# Patient Record
(demographics unavailable — no encounter records)

---

## 2024-12-18 NOTE — PHYSICAL EXAM
[2+] : left foot dorsalis pedis 2+ [No Joint Swelling] : no joint swelling [Normal Foot/Ankle] : Both lower extremities were exposed and visualized. Standing exam demonstrates normal foot posture and alignment. Hindfoot exam shows no hindfoot valgus or varus [Skin Color & Pigmentation] : normal skin color and pigmentation [Skin Turgor] : normal skin turgor [Skin Lesions] : no skin lesions [Sensation] : the sensory exam was normal to light touch and pinprick [No Focal Deficits] : no focal deficits [Motor Exam] : the motor exam was normal [Deep Tendon Reflexes (DTR)] : deep tendon reflexes were 2+ and symmetric [General Appearance - Alert] : alert [General Appearance - Well-Appearing] : healthy appearing [Oriented To Time, Place, And Person] : oriented to person, place, and time [Varicose Veins Of Lower Extremities] : not present [Ankle Swelling (On Exam)] : not present [] : not present [Delayed in the Right Toes] : capillary refills normal in right toes [Delayed in the Left Toes] : capillary refills normal in the left toes [de-identified] : Pain along the medial band of the plantar fascia, right foot, just proximal to the sesamoid apparatus.  Pain with dorsiflexion of the digits.   [Foot Ulcer] : no foot ulcer [Skin Induration] : no skin induration

## 2024-12-18 NOTE — PHYSICAL EXAM
[2+] : left foot dorsalis pedis 2+ [No Joint Swelling] : no joint swelling [Normal Foot/Ankle] : Both lower extremities were exposed and visualized. Standing exam demonstrates normal foot posture and alignment. Hindfoot exam shows no hindfoot valgus or varus [Skin Color & Pigmentation] : normal skin color and pigmentation [Skin Turgor] : normal skin turgor [Skin Lesions] : no skin lesions [Sensation] : the sensory exam was normal to light touch and pinprick [No Focal Deficits] : no focal deficits [Motor Exam] : the motor exam was normal [Deep Tendon Reflexes (DTR)] : deep tendon reflexes were 2+ and symmetric [General Appearance - Alert] : alert [General Appearance - Well-Appearing] : healthy appearing [Oriented To Time, Place, And Person] : oriented to person, place, and time [Varicose Veins Of Lower Extremities] : not present [Ankle Swelling (On Exam)] : not present [] : not present [Delayed in the Right Toes] : capillary refills normal in right toes [Delayed in the Left Toes] : capillary refills normal in the left toes [de-identified] : Pain along the medial band of the plantar fascia, right foot, just proximal to the sesamoid apparatus.  Pain with dorsiflexion of the digits.   [Foot Ulcer] : no foot ulcer [Skin Induration] : no skin induration

## 2024-12-18 NOTE — CONSULT LETTER
[Dear  ___] : Dear  [unfilled], [Courtesy Letter:] : I had the pleasure of seeing your patient, [unfilled], in my office today. [Please see my note below.] : Please see my note below. [Consult Closing:] : Thank you very much for allowing me to participate in the care of this patient.  If you have any questions, please do not hesitate to contact me. [Sincerely,] : Sincerely, [FreeTextEntry3] : Charles M. Lombardi, DPM, FACFAS Systems Chief, Podiatric Services [facility] Assistant Professor of Surgery [facility] [employer] at [facility]  [FreeTextEntry2] : Leslie De La Garza,  [phone] [place], [place] [id number]

## 2024-12-18 NOTE — CONSULT LETTER
[Dear  ___] : Dear  [unfilled], [Courtesy Letter:] : I had the pleasure of seeing your patient, [unfilled], in my office today. [Please see my note below.] : Please see my note below. [Consult Closing:] : Thank you very much for allowing me to participate in the care of this patient.  If you have any questions, please do not hesitate to contact me. [Sincerely,] : Sincerely, [FreeTextEntry3] : Charles M. Lombardi, DPM, FACFAS Systems Chief, Podiatric Services [facility] Assistant Professor of Surgery [facility] [employer] at [facility]  [FreeTextEntry2] : Leslie De La Garza,  [phone] [place], [place] [id number]

## 2024-12-18 NOTE — ASSESSMENT
[FreeTextEntry1] : Impression: Plantar fasciitis, right, medial band (M72.2) causing pain (M79.671).  Recommendations: Attempt an injection into the right heel.  After discussing all risks, benefits and alternatives, patient consented.  Treatment: Patient was injected with a combination of 1% Xylocaine, plain, 0.5% Marcaine, plain and Kenalog-40, a total of 3cc's into the medial band of the plantar fascia, right foot.  Patient was given home care instructions, ice therapy and stretching exercises as well as shoe gear modifications.  Will reevaluate in 2 weeks, if pain persists.  Any questions or problems, patient is to contact the office.

## 2024-12-18 NOTE — HISTORY OF PRESENT ILLNESS
[FreeTextEntry1] : Patient presents today with a chief complaint of pain along the medial, longitudinal arch of the right foot, just proximal to the sesamoids along the medial band of the plantar fascia.  Patient states that this has been going on for several weeks.  He has had it in the past.  He wears custom orthotics.  Patient has typical post static dyskinesia and pain on palpation of the medial tuberosity.  He has pain with first step in the morning.

## 2024-12-18 NOTE — CONSULT LETTER
[Dear  ___] : Dear  [unfilled], [Courtesy Letter:] : I had the pleasure of seeing your patient, [unfilled], in my office today. [Please see my note below.] : Please see my note below. [Consult Closing:] : Thank you very much for allowing me to participate in the care of this patient.  If you have any questions, please do not hesitate to contact me. [Sincerely,] : Sincerely, [FreeTextEntry2] : Leslie De La Garza,  [phone] [place], [place] [id number]  [FreeTextEntry3] : Charles M. Lombardi, DPM, FACFAS Systems Chief, Podiatric Services [facility] Assistant Professor of Surgery [facility] [employer] at [facility]

## 2024-12-18 NOTE — PHYSICAL EXAM
[2+] : left foot dorsalis pedis 2+ [No Joint Swelling] : no joint swelling [Normal Foot/Ankle] : Both lower extremities were exposed and visualized. Standing exam demonstrates normal foot posture and alignment. Hindfoot exam shows no hindfoot valgus or varus [Skin Color & Pigmentation] : normal skin color and pigmentation [Skin Turgor] : normal skin turgor [Skin Lesions] : no skin lesions [Sensation] : the sensory exam was normal to light touch and pinprick [No Focal Deficits] : no focal deficits [Motor Exam] : the motor exam was normal [Deep Tendon Reflexes (DTR)] : deep tendon reflexes were 2+ and symmetric [General Appearance - Alert] : alert [General Appearance - Well-Appearing] : healthy appearing [Oriented To Time, Place, And Person] : oriented to person, place, and time [Ankle Swelling (On Exam)] : not present [Varicose Veins Of Lower Extremities] : not present [] : not present [Delayed in the Right Toes] : capillary refills normal in right toes [Delayed in the Left Toes] : capillary refills normal in the left toes [de-identified] : Pain along the medial band of the plantar fascia, right foot, just proximal to the sesamoid apparatus.  Pain with dorsiflexion of the digits.   [Foot Ulcer] : no foot ulcer [Skin Induration] : no skin induration

## 2024-12-18 NOTE — PROCEDURE
[Plantar Fascia Injection] : ~M plantar fascia (heel) injection [Right Foot] : was performed on the right foot [Therapeutic] : therapeutic [Patient] : the patient [Risks] : risks [Benefits] : benefits [Alternatives] : alternatives [1%] : 1%  [Alcohol] : alcohol [25 gauge] : A 25 gauge needle was used [Kenalog 40] : Kenalog 40 [Tolerated Well] : tolerated the procedure well [No Complications] : There were no complications. [Instructions Given] : given handouts/patient instructions [Patient Instructed to Call] : instructed to call if redness at site, a decrease in range of motion or an increase in pain is noted after procedure. [FreeTextEntry1] : X-rays were taken to rule out fracture. (3 views - right foot) No evidence of any fracture/dislocation.   Noted: Bipartite sesamoid.  The patient has no pain in that area.  The pain is proximal to the sesamoid apparatus.